# Patient Record
Sex: FEMALE | Race: WHITE | NOT HISPANIC OR LATINO
[De-identification: names, ages, dates, MRNs, and addresses within clinical notes are randomized per-mention and may not be internally consistent; named-entity substitution may affect disease eponyms.]

---

## 2024-02-28 PROBLEM — Z00.00 ENCOUNTER FOR PREVENTIVE HEALTH EXAMINATION: Status: ACTIVE | Noted: 2024-02-28

## 2024-02-29 ENCOUNTER — APPOINTMENT (OUTPATIENT)
Age: 44
End: 2024-02-29
Payer: COMMERCIAL

## 2024-02-29 VITALS
WEIGHT: 137 LBS | HEIGHT: 60 IN | SYSTOLIC BLOOD PRESSURE: 125 MMHG | DIASTOLIC BLOOD PRESSURE: 82 MMHG | BODY MASS INDEX: 26.9 KG/M2

## 2024-02-29 DIAGNOSIS — E04.9 NONTOXIC GOITER, UNSPECIFIED: ICD-10-CM

## 2024-02-29 DIAGNOSIS — Z78.9 OTHER SPECIFIED HEALTH STATUS: ICD-10-CM

## 2024-02-29 DIAGNOSIS — E04.1 NONTOXIC SINGLE THYROID NODULE: ICD-10-CM

## 2024-02-29 DIAGNOSIS — E04.2 NONTOXIC MULTINODULAR GOITER: ICD-10-CM

## 2024-02-29 PROCEDURE — 99204 OFFICE O/P NEW MOD 45 MIN: CPT

## 2024-03-01 PROBLEM — Z78.9 DENIES ALCOHOL CONSUMPTION: Status: ACTIVE | Noted: 2024-03-01

## 2024-03-01 PROBLEM — E04.2 MULTINODULAR GOITER: Status: ACTIVE | Noted: 2024-03-01

## 2024-03-01 PROBLEM — Z78.9 NO FAMILY HISTORY OF THYROID DISEASE: Status: ACTIVE | Noted: 2024-03-01

## 2024-03-01 PROBLEM — E04.9 EUTHYROID GOITER: Status: ACTIVE | Noted: 2024-03-01

## 2024-03-01 PROBLEM — Z78.9 NON-SMOKER: Status: ACTIVE | Noted: 2024-03-01

## 2024-03-01 PROBLEM — E04.1 THYROID NODULE: Status: ACTIVE | Noted: 2024-03-01

## 2024-03-01 NOTE — CONSULT LETTER
[Consult Letter:] : I had the pleasure of evaluating your patient, [unfilled]. [Consult Closing:] : Thank you very much for allowing me to participate in the care of this patient.  If you have any questions, please do not hesitate to contact me. [Sincerely,] : Sincerely, [Dear  ___] : Dear  [unfilled], [FreeTextEntry3] : Dr. Desiree Saxena [FreeTextEntry2] : Dr. Sherry Diaz [DrAlden  ___] : Dr. ARENAS

## 2024-03-01 NOTE — HISTORY OF PRESENT ILLNESS
[de-identified] : Patient referred by Dr. Diaz for evaluation of suspicious isthmus thyroid nodule. She first noticed this nodule in 2021 after a family member noticed the appearance of the nodule on the patient's neck. Thyroid US on 8/11/21 showed normal thyroid size, several benign colloid cysts on right lobe largest measuring 10 mm. On the left lobe, no lesions seen. At the isthmus, a 1.5 x 7 x 13 mm solid or almost completely solid hypoechoic nodule seen. Another 6 x 4 x 6 mm hypoechoic solid nodule seen at isthmus. The larger isthmus nodule was biopsied on 8/13/21 and this showed atypia of undetermined significance and genetic testing results were insufficient. She had an updated thyroid US on 10/14/23 showed subcentimeter thyroid nodules in the right lobe and a 2.4 x 1.1 x 2.2 cm hypoechoic nodule TR4. Biopsy of isthmus nodule was done on 1/26/24 which revealed atypia of undetermined significance and Thyroseq was positive with intermediate risk (40-50%) of malignancy.  Patient states she has not had any symptoms associated with her thyroid. She denies neck pain or swelling, dysphagia, shortness of breath, hoarseness or voice changes, fevers, weight changes, or recent illness. She denies any significant past medical history. She denies past exposure to radiation or radiation therapy.

## 2024-03-01 NOTE — PHYSICAL EXAM
[Midline] : located in midline position [de-identified] : ~2.5 cm nodule appreciated at the isthmus of thyroid, firm and smooth, thyroid appears normal in size, no lymphadenopathy in the neck. Trachea midline.  [Normal] : extraocular movements are normal [de-identified] : Skin: No rash or lesions on visualized skin Psych: normal and appropriate affect [de-identified] : Grossly intact [FreeTextEntry1] : Patient is not in acute distress, sits comfortably in exam room

## 2024-03-01 NOTE — ASSESSMENT
[FreeTextEntry1] : Patient with suspicious thyroid nodule.  I discussed right thyroid lobectomy and isthmus versus total thyroidectomy with CND for treatment. I have reviewed the pathophysiology of the disease process, the area anatomy and the rationale for surgery.  I discussed the risks, benefits and alternative treatments which include but are not limited to bleeding, infection, numbness, hoarseness, hypocalcemia, scarring, and need for reoperation.  I have answered the patient's questions to their satisfaction.  The patient wishes to proceed with the recommended procedure.  They will contact my office to schedule surgery. Reviewed routine post operative management with patient. Patient expressed understanding and wishes to proceed with surgery which we will schedule for her today.

## 2024-03-01 NOTE — REASON FOR VISIT
[Initial Consultation] : an initial consultation for [FreeTextEntry2] : Thyroid nodule suspicious for thyroid cancer [Friend] : friend

## 2024-03-11 ENCOUNTER — OUTPATIENT (OUTPATIENT)
Dept: OUTPATIENT SERVICES | Facility: HOSPITAL | Age: 44
LOS: 1 days | End: 2024-03-11
Payer: COMMERCIAL

## 2024-03-11 VITALS
OXYGEN SATURATION: 99 % | HEART RATE: 72 BPM | TEMPERATURE: 98 F | HEIGHT: 60 IN | RESPIRATION RATE: 18 BRPM | WEIGHT: 143.3 LBS | DIASTOLIC BLOOD PRESSURE: 82 MMHG | SYSTOLIC BLOOD PRESSURE: 119 MMHG

## 2024-03-11 DIAGNOSIS — Z01.818 ENCOUNTER FOR OTHER PREPROCEDURAL EXAMINATION: ICD-10-CM

## 2024-03-11 DIAGNOSIS — Z98.890 OTHER SPECIFIED POSTPROCEDURAL STATES: Chronic | ICD-10-CM

## 2024-03-11 DIAGNOSIS — D49.7 NEOPLASM OF UNSPECIFIED BEHAVIOR OF ENDOCRINE GLANDS AND OTHER PARTS OF NERVOUS SYSTEM: ICD-10-CM

## 2024-03-11 PROCEDURE — 80048 BASIC METABOLIC PNL TOTAL CA: CPT

## 2024-03-11 PROCEDURE — 85027 COMPLETE CBC AUTOMATED: CPT

## 2024-03-11 PROCEDURE — 86900 BLOOD TYPING SEROLOGIC ABO: CPT

## 2024-03-11 PROCEDURE — 84443 ASSAY THYROID STIM HORMONE: CPT

## 2024-03-11 PROCEDURE — 86901 BLOOD TYPING SEROLOGIC RH(D): CPT

## 2024-03-11 PROCEDURE — 84480 ASSAY TRIIODOTHYRONINE (T3): CPT

## 2024-03-11 PROCEDURE — G0463: CPT

## 2024-03-11 PROCEDURE — 86850 RBC ANTIBODY SCREEN: CPT

## 2024-03-11 PROCEDURE — 84436 ASSAY OF TOTAL THYROXINE: CPT

## 2024-03-11 PROCEDURE — 36415 COLL VENOUS BLD VENIPUNCTURE: CPT

## 2024-03-11 NOTE — H&P PST ADULT - NSALCOHOLAMT_GEN_A_CORE_SD
Benign prostatic hypertrophy without lower urinary tract symptoms  BPH (benign prostatic hyperplasia)  Essential hypertension  HTN (hypertension)
1-2 drinks

## 2024-03-11 NOTE — H&P PST ADULT - NSANTHOSAYNRD_GEN_A_CORE
neck inches/No. AARON screening performed.  STOP BANG Legend: 0-2 = LOW Risk; 3-4 = INTERMEDIATE Risk; 5-8 = HIGH Risk neck 13 inches/No. AARON screening performed.  STOP BANG Legend: 0-2 = LOW Risk; 3-4 = INTERMEDIATE Risk; 5-8 = HIGH Risk

## 2024-03-11 NOTE — H&P PST ADULT - PROBLEM SELECTOR PLAN 1
Patient provided with pre-operative instructions and verbalized understanding.  Patient will be NPO on day of surgery. Patient will stop NSAIDs, aspirin, herbal supplements or vitamins 1 week prior to surgery.  Chlorhexidine wash provided with instructions, verbalized understanding. Bactrim Counseling:  I discussed with the patient the risks of sulfa antibiotics including but not limited to GI upset, allergic reaction, drug rash, diarrhea, dizziness, photosensitivity, and yeast infections.  Rarely, more serious reactions can occur including but not limited to aplastic anemia, agranulocytosis, methemoglobinemia, blood dyscrasias, liver or kidney failure, lung infiltrates or desquamative/blistering drug rashes.

## 2024-03-11 NOTE — H&P PST ADULT - HISTORY OF PRESENT ILLNESS
Patient is a 43 year old F with PMHx of __ or no pertinent medical history presents for perioperative testing for total thyroidectomy with central neck dissection and closure with forzen section with Dr. Desiree Saxena scheduled for 03/18/2024. Reports___. Denies ___ or any acute symptoms at this time. Patient otherwise feels well overall.    Patient is a 43 year old F with PMHx of __ or no pertinent medical history presents for perioperative testing for total thyroidectomy with central neck dissection and closure with forzen section with Dr. Desiree Saxena scheduled for 03/18/2024. Reports___. Denies ___ or any acute symptoms at this time. Patient otherwise feels well overall.   had meolecular testing, gorwing nodule, opted of sx  Patient is a 43 year old F with no pertinent medical history presents for perioperative testing for total thyroidectomy with central neck dissection and closure with frozen section with Dr. Desiree Saxena scheduled for 03/18/2024. Reports having a thyroid nodule monitored, recently grew in size, biopsy came back inconclusive  but molecular test came back positive therefore recommended for upcoming procedure. Denies any acute symptoms at this time. Patient otherwise feels well overall.

## 2024-03-17 ENCOUNTER — TRANSCRIPTION ENCOUNTER (OUTPATIENT)
Age: 44
End: 2024-03-17

## 2024-03-18 ENCOUNTER — RESULT REVIEW (OUTPATIENT)
Age: 44
End: 2024-03-18

## 2024-03-18 ENCOUNTER — APPOINTMENT (OUTPATIENT)
Age: 44
End: 2024-03-18
Payer: COMMERCIAL

## 2024-03-18 ENCOUNTER — TRANSCRIPTION ENCOUNTER (OUTPATIENT)
Age: 44
End: 2024-03-18

## 2024-03-18 ENCOUNTER — OUTPATIENT (OUTPATIENT)
Dept: OUTPATIENT SERVICES | Facility: HOSPITAL | Age: 44
LOS: 1 days | End: 2024-03-18
Payer: COMMERCIAL

## 2024-03-18 VITALS
TEMPERATURE: 97 F | WEIGHT: 143.3 LBS | HEIGHT: 60 IN | OXYGEN SATURATION: 99 % | HEART RATE: 79 BPM | SYSTOLIC BLOOD PRESSURE: 121 MMHG | RESPIRATION RATE: 13 BRPM | DIASTOLIC BLOOD PRESSURE: 80 MMHG

## 2024-03-18 VITALS
RESPIRATION RATE: 16 BRPM | SYSTOLIC BLOOD PRESSURE: 111 MMHG | HEART RATE: 71 BPM | OXYGEN SATURATION: 96 % | DIASTOLIC BLOOD PRESSURE: 76 MMHG

## 2024-03-18 DIAGNOSIS — D49.7 NEOPLASM OF UNSPECIFIED BEHAVIOR OF ENDOCRINE GLANDS AND OTHER PARTS OF NERVOUS SYSTEM: ICD-10-CM

## 2024-03-18 DIAGNOSIS — Z98.890 OTHER SPECIFIED POSTPROCEDURAL STATES: Chronic | ICD-10-CM

## 2024-03-18 PROCEDURE — 13132 CMPLX RPR F/C/C/M/N/AX/G/H/F: CPT | Mod: 59

## 2024-03-18 PROCEDURE — C9399: CPT

## 2024-03-18 PROCEDURE — 88307 TISSUE EXAM BY PATHOLOGIST: CPT | Mod: 26

## 2024-03-18 PROCEDURE — 88307 TISSUE EXAM BY PATHOLOGIST: CPT

## 2024-03-18 PROCEDURE — C1889: CPT

## 2024-03-18 PROCEDURE — 60252 REMOVAL OF THYROID: CPT

## 2024-03-18 DEVICE — LIGATING CLIPS WECK HORIZON SMALL (YELLOW) 24: Type: IMPLANTABLE DEVICE | Status: FUNCTIONAL

## 2024-03-18 DEVICE — LIGATING CLIPS WECK HORIZON MEDIUM (BLUE) 6: Type: IMPLANTABLE DEVICE | Status: FUNCTIONAL

## 2024-03-18 RX ORDER — BENZOCAINE AND MENTHOL 5; 1 G/100ML; G/100ML
1 LIQUID ORAL
Refills: 0 | Status: DISCONTINUED | OUTPATIENT
Start: 2024-03-18 | End: 2024-04-01

## 2024-03-18 RX ORDER — ACETAMINOPHEN 500 MG
650 TABLET ORAL EVERY 6 HOURS
Refills: 0 | Status: DISCONTINUED | OUTPATIENT
Start: 2024-03-18 | End: 2024-04-01

## 2024-03-18 RX ORDER — HYDROMORPHONE HYDROCHLORIDE 2 MG/ML
0.5 INJECTION INTRAMUSCULAR; INTRAVENOUS; SUBCUTANEOUS
Refills: 0 | Status: DISCONTINUED | OUTPATIENT
Start: 2024-03-18 | End: 2024-03-18

## 2024-03-18 RX ORDER — SODIUM CHLORIDE 9 MG/ML
1000 INJECTION, SOLUTION INTRAVENOUS
Refills: 0 | Status: DISCONTINUED | OUTPATIENT
Start: 2024-03-18 | End: 2024-03-18

## 2024-03-18 RX ORDER — ONDANSETRON 8 MG/1
4 TABLET, FILM COATED ORAL ONCE
Refills: 0 | Status: DISCONTINUED | OUTPATIENT
Start: 2024-03-18 | End: 2024-03-18

## 2024-03-18 RX ORDER — ACETAMINOPHEN 500 MG
2 TABLET ORAL
Qty: 0 | Refills: 0 | DISCHARGE
Start: 2024-03-18

## 2024-03-18 RX ORDER — HYDROMORPHONE HYDROCHLORIDE 2 MG/ML
1 INJECTION INTRAMUSCULAR; INTRAVENOUS; SUBCUTANEOUS
Refills: 0 | Status: DISCONTINUED | OUTPATIENT
Start: 2024-03-18 | End: 2024-03-18

## 2024-03-18 RX ADMIN — Medication 2 TABLET(S): at 13:50

## 2024-03-18 RX ADMIN — SODIUM CHLORIDE 50 MILLILITER(S): 9 INJECTION, SOLUTION INTRAVENOUS at 08:23

## 2024-03-18 NOTE — ASU DISCHARGE PLAN (ADULT/PEDIATRIC) - CARE PROVIDER_API CALL
Desiree Saxena Lindsey  Surgery  [address] [place] [address], [facility] [address]  [place] [address]  Phone: [phone]  Fax: [phone]  Scheduled Appointment: 03/26/2024

## 2024-03-18 NOTE — ASU PATIENT PROFILE, ADULT - FALL HARM RISK - UNIVERSAL INTERVENTIONS
Bed in lowest position, wheels locked, appropriate side rails in place/Call bell, personal items and telephone in reach/Instruct patient to call for assistance before getting out of bed or chair/Non-slip footwear when patient is out of bed/[place] to call system/Physically safe environment - no spills, clutter or unnecessary equipment/Purposeful Proactive Rounding/Room/bathroom lighting operational, light cord in reach

## 2024-03-18 NOTE — BRIEF OPERATIVE NOTE - OPERATION/FINDINGS
Total thyroidectomy with central neck dissection, hemostasis with vicryl ties, strap muscle fascia closed over trachea and skin closed in layers.

## 2024-03-18 NOTE — ASU DISCHARGE PLAN (ADULT/PEDIATRIC) - NURSING INSTRUCTIONS
All discharge information reviewed with patient including pain, safety, medication and follow up care . Pt acknowledges understanding of discharge instructions. Take calcium as directed.

## 2024-03-18 NOTE — ASU DISCHARGE PLAN (ADULT/PEDIATRIC) - ASU DC SPECIAL INSTRUCTIONSFT
- Wear a supportive bra for 24 hours per day for at least 2 weeks to help with scar healing  - Do not take NSAID medications  - Take tylenol 650 mg every 6 hours as needed for pain  - Start taking calcium carbonate 500 mg or 600 mg as two tablets three times daily. This is to prevent low calcium levels after surgery.

## 2024-03-19 ENCOUNTER — NON-APPOINTMENT (OUTPATIENT)
Age: 44
End: 2024-03-19

## 2024-03-20 ENCOUNTER — NON-APPOINTMENT (OUTPATIENT)
Age: 44
End: 2024-03-20

## 2024-03-21 LAB
25(OH)D3 SERPL-MCNC: 17.9 NG/ML
CALCIUM SERPL-MCNC: 11.4 MG/DL
CALCIUM SERPL-MCNC: 11.4 MG/DL
PARATHYROID HORMONE INTACT: 12 PG/ML
T3 SERPL-MCNC: 82 NG/DL
T4 FREE SERPL-MCNC: 1.6 NG/DL
TSH SERPL-ACNC: 1.49 UIU/ML

## 2024-03-26 ENCOUNTER — NON-APPOINTMENT (OUTPATIENT)
Age: 44
End: 2024-03-26

## 2024-03-26 ENCOUNTER — APPOINTMENT (OUTPATIENT)
Dept: SURGERY | Facility: CLINIC | Age: 44
End: 2024-03-26
Payer: COMMERCIAL

## 2024-03-26 DIAGNOSIS — D49.7 NEOPLASM OF UNSPECIFIED BEHAVIOR OF ENDOCRINE GLANDS AND OTHER PARTS OF NERVOUS SYSTEM: ICD-10-CM

## 2024-03-26 PROBLEM — Z78.9 OTHER SPECIFIED HEALTH STATUS: Chronic | Status: ACTIVE | Noted: 2024-03-11

## 2024-03-26 LAB — SURGICAL PATHOLOGY STUDY: SIGNIFICANT CHANGE UP

## 2024-03-26 PROCEDURE — 36415 COLL VENOUS BLD VENIPUNCTURE: CPT

## 2024-03-26 PROCEDURE — 99024 POSTOP FOLLOW-UP VISIT: CPT

## 2024-03-26 NOTE — HISTORY OF PRESENT ILLNESS
[de-identified] : Patient referred by Dr. Diaz for evaluation of suspicious isthmus thyroid nodule. She first noticed this nodule in 2021 after a family member noticed the appearance of the nodule on the patient's neck. Thyroid US on 8/11/21 showed normal thyroid size, several benign colloid cysts on right lobe largest measuring 10 mm. On the left lobe, no lesions seen. At the isthmus, a 1.5 x 7 x 13 mm solid or almost completely solid hypoechoic nodule seen. Another 6 x 4 x 6 mm hypoechoic solid nodule seen at isthmus. The larger isthmus nodule was biopsied on 8/13/21 and this showed atypia of undetermined significance and genetic testing results were insufficient. She had an updated thyroid US on 10/14/23 showed subcentimeter thyroid nodules in the right lobe and a 2.4 x 1.1 x 2.2 cm hypoechoic nodule TR4. Biopsy of isthmus nodule was done on 1/26/24 which revealed atypia of undetermined significance and Thyroseq was positive with intermediate risk (40-50%) of malignancy.  3/18/24 total thyroidectomy with CND, path pending.  Patient denies dysphagia, hoarseness, pain or parathesias. has not been taking calciuma nd started thyroid meds per Dr. Diaz. feeling well.  I have reviewed all old and new data and available images.

## 2024-03-26 NOTE — ASSESSMENT
[FreeTextEntry1] : Patient with suspicious thyroid nodule.doing well s/p total thyroidectomy.  barbie sent.  seeing Dr Diaz 5/2024, RTo 4 mo.  I have answered their questions to the best of my ability.

## 2024-03-26 NOTE — PHYSICAL EXAM
[de-identified] : ~2.5 cm nodule appreciated at the isthmus of thyroid, firm and smooth, thyroid appears normal in size, no lymphadenopathy in the neck. Trachea midline.  [Midline] : located in midline position [Normal] : orientation to person, place, and time: normal [FreeTextEntry1] : Patient is not in acute distress, sits comfortably in exam room [de-identified] : Grossly intact [de-identified] : Skin: No rash or lesions on visualized skin Psych: normal and appropriate affect

## 2024-07-25 ENCOUNTER — APPOINTMENT (OUTPATIENT)
Dept: SURGERY | Facility: CLINIC | Age: 44
End: 2024-07-25
Payer: COMMERCIAL

## 2024-07-25 ENCOUNTER — LABORATORY RESULT (OUTPATIENT)
Age: 44
End: 2024-07-25

## 2024-07-25 DIAGNOSIS — D49.7 NEOPLASM OF UNSPECIFIED BEHAVIOR OF ENDOCRINE GLANDS AND OTHER PARTS OF NERVOUS SYSTEM: ICD-10-CM

## 2024-07-25 DIAGNOSIS — E04.9 NONTOXIC GOITER, UNSPECIFIED: ICD-10-CM

## 2024-07-25 PROCEDURE — 99213 OFFICE O/P EST LOW 20 MIN: CPT

## 2024-07-25 PROCEDURE — G2211 COMPLEX E/M VISIT ADD ON: CPT | Mod: NC

## 2024-07-25 NOTE — PHYSICAL EXAM
[de-identified] : Incision healing with min swelling, scar min discussed. no cervical or supraclavicular adenopathy, trachea midline, no palpable mass [Midline] : located in midline position [Normal] : orientation to person, place, and time: normal [FreeTextEntry1] : Patient is not in acute distress, sits comfortably in exam room [de-identified] : Grossly intact [de-identified] : Skin: No rash or lesions on visualized skin Psych: normal and appropriate affect

## 2024-07-25 NOTE — HISTORY OF PRESENT ILLNESS
[de-identified] : Patient referred by Dr. Diaz for evaluation of suspicious isthmus thyroid nodule. She first noticed this nodule in 2021 after a family member noticed the appearance of the nodule on the patient's neck. Thyroid US on 8/11/21 showed normal thyroid size, several benign colloid cysts on right lobe largest measuring 10 mm. On the left lobe, no lesions seen. At the isthmus, a 1.5 x 7 x 13 mm solid or almost completely solid hypoechoic nodule seen. Another 6 x 4 x 6 mm hypoechoic solid nodule seen at isthmus. The larger isthmus nodule was biopsied on 8/13/21 and this showed atypia of undetermined significance and genetic testing results were insufficient. She had an updated thyroid US on 10/14/23 showed subcentimeter thyroid nodules in the right lobe and a 2.4 x 1.1 x 2.2 cm hypoechoic nodule TR4. Biopsy of isthmus nodule was done on 1/26/24 which revealed atypia of undetermined significance and Thyroseq was positive with intermediate risk (40-50%) of malignancy.  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  Patient denies dysphagia, hoarseness.  Patient currently on 125 mcg feeling well.  Thyroglobulin 0.5 in April.  Patient scheduled for CHAN scan and treatment in 2 weeks.  Ordered by Dr. Diaz. feeling well.  I have reviewed all old and new data and available images.

## 2024-07-25 NOTE — ASSESSMENT
[FreeTextEntry1] : Patient with  hurthle cell cancer s/p total thyroidectomy.  barbie sent.  patient will contact office to review.  patient scheduled for [place] will follow up.  RTo 6 mo.  I reviewed the expected course of illness and the intent of current treatment with the patient. I have answered her questions. scheduled for neck US 10/2024 with Dr hanson.

## 2024-07-30 ENCOUNTER — NON-APPOINTMENT (OUTPATIENT)
Age: 44
End: 2024-07-30

## 2024-07-30 LAB
25(OH)D3 SERPL-MCNC: 32.7 NG/ML
CALCIUM SERPL-MCNC: 10.2 MG/DL
CALCIUM SERPL-MCNC: 10.2 MG/DL
PARATHYROID HORMONE INTACT: 23 PG/ML
T3 SERPL-MCNC: 115 NG/DL
T4 FREE SERPL-MCNC: 1.6 NG/DL
THYROGLOB AB SERPL-ACNC: <20 IU/ML
THYROGLOB SERPL-MCNC: <0.2 NG/ML
TSH SERPL-ACNC: 0.29 UIU/ML

## 2024-08-05 ENCOUNTER — OUTPATIENT (OUTPATIENT)
Dept: OUTPATIENT SERVICES | Facility: HOSPITAL | Age: 44
LOS: 1 days | End: 2024-08-05
Payer: COMMERCIAL

## 2024-08-05 ENCOUNTER — APPOINTMENT (OUTPATIENT)
Dept: NUCLEAR MEDICINE | Facility: HOSPITAL | Age: 44
End: 2024-08-05

## 2024-08-05 DIAGNOSIS — Z98.890 OTHER SPECIFIED POSTPROCEDURAL STATES: Chronic | ICD-10-CM

## 2024-08-05 DIAGNOSIS — C73 MALIGNANT NEOPLASM OF THYROID GLAND: ICD-10-CM

## 2024-08-06 ENCOUNTER — APPOINTMENT (OUTPATIENT)
Dept: NUCLEAR MEDICINE | Facility: HOSPITAL | Age: 44
End: 2024-08-06

## 2024-08-07 ENCOUNTER — APPOINTMENT (OUTPATIENT)
Dept: NUCLEAR MEDICINE | Facility: HOSPITAL | Age: 44
End: 2024-08-07

## 2024-08-09 ENCOUNTER — APPOINTMENT (OUTPATIENT)
Dept: NUCLEAR MEDICINE | Facility: HOSPITAL | Age: 44
End: 2024-08-09

## 2024-08-09 PROCEDURE — 78830 RP LOCLZJ TUM SPECT W/CT 1: CPT | Mod: 26

## 2024-08-09 PROCEDURE — 78830 RP LOCLZJ TUM SPECT W/CT 1: CPT

## 2024-08-09 PROCEDURE — 78018 THYROID MET IMAGING BODY: CPT | Mod: 26

## 2024-08-09 PROCEDURE — 78018 THYROID MET IMAGING BODY: CPT

## 2024-08-09 PROCEDURE — A9528: CPT

## 2024-08-09 PROCEDURE — 78020 THYROID MET UPTAKE: CPT

## 2024-08-09 PROCEDURE — 96372 THER/PROPH/DIAG INJ SC/IM: CPT

## 2024-08-09 PROCEDURE — 78020 THYROID MET UPTAKE: CPT | Mod: 26

## 2024-09-25 ENCOUNTER — OUTPATIENT (OUTPATIENT)
Dept: OUTPATIENT SERVICES | Facility: HOSPITAL | Age: 44
LOS: 1 days | End: 2024-09-25
Payer: COMMERCIAL

## 2024-09-25 ENCOUNTER — APPOINTMENT (OUTPATIENT)
Dept: NUCLEAR MEDICINE | Facility: HOSPITAL | Age: 44
End: 2024-09-25
Payer: COMMERCIAL

## 2024-09-25 DIAGNOSIS — Z98.890 OTHER SPECIFIED POSTPROCEDURAL STATES: Chronic | ICD-10-CM

## 2024-09-25 DIAGNOSIS — C73 MALIGNANT NEOPLASM OF THYROID GLAND: ICD-10-CM

## 2024-09-25 LAB — HCG SERPL-ACNC: <2 MIU/ML — SIGNIFICANT CHANGE UP

## 2024-09-25 PROCEDURE — 99203 OFFICE O/P NEW LOW 30 MIN: CPT

## 2024-09-25 RX ORDER — ONDANSETRON 4 MG/1
4 TABLET, ORALLY DISINTEGRATING ORAL 4 TIMES DAILY
Qty: 20 | Refills: 0 | Status: ACTIVE | COMMUNITY
Start: 2024-09-25 | End: 1900-01-01

## 2024-09-26 ENCOUNTER — APPOINTMENT (OUTPATIENT)
Dept: NUCLEAR MEDICINE | Facility: HOSPITAL | Age: 44
End: 2024-09-26

## 2024-09-27 ENCOUNTER — APPOINTMENT (OUTPATIENT)
Dept: NUCLEAR MEDICINE | Facility: HOSPITAL | Age: 44
End: 2024-09-27

## 2024-09-27 PROCEDURE — 79005 NUCLEAR RX ORAL ADMIN: CPT | Mod: 26

## 2024-09-30 ENCOUNTER — APPOINTMENT (OUTPATIENT)
Dept: NUCLEAR MEDICINE | Facility: HOSPITAL | Age: 44
End: 2024-09-30

## 2024-09-30 PROCEDURE — A9517: CPT

## 2024-09-30 PROCEDURE — 96372 THER/PROPH/DIAG INJ SC/IM: CPT

## 2024-09-30 PROCEDURE — 78018 THYROID MET IMAGING BODY: CPT

## 2024-09-30 PROCEDURE — 36415 COLL VENOUS BLD VENIPUNCTURE: CPT

## 2024-09-30 PROCEDURE — 79005 NUCLEAR RX ORAL ADMIN: CPT

## 2024-09-30 PROCEDURE — 78018 THYROID MET IMAGING BODY: CPT | Mod: 26

## 2024-09-30 PROCEDURE — 84702 CHORIONIC GONADOTROPIN TEST: CPT

## 2024-10-04 NOTE — ASU PATIENT PROFILE, ADULT - ALCOHOL USE HISTORY SINGLE SELECT
Bed/Stretcher in lowest position, wheels locked, appropriate side rails in place/Call bell, personal items and telephone in reach/Instruct patient to call for assistance before getting out of bed/chair/stretcher/Non-slip footwear applied when patient is off stretcher/[place] to call system/Physically safe environment - no spills, clutter or unnecessary equipment/Purposeful proactive rounding/Room/bathroom lighting operational, light cord in reach yes...

## 2025-01-23 ENCOUNTER — APPOINTMENT (OUTPATIENT)
Dept: SURGERY | Facility: CLINIC | Age: 45
End: 2025-01-23

## (undated) DEVICE — SUT ETHILON 3-0 18" FS-1

## (undated) DEVICE — DRSG BENZOIN 0.6CC

## (undated) DEVICE — NDL HYPO SAFE 25G X 1.5" (ORANGE)

## (undated) DEVICE — SUCTION YANKAUER TAPERED BULBOUS NO VENT

## (undated) DEVICE — TUBING 10FT W/WAND

## (undated) DEVICE — SUT MONOCRYL 4-0 27" PS-2 UNDYED

## (undated) DEVICE — DRAPE 3/4 SHEET 52X76"

## (undated) DEVICE — SUT VICRYL 3-0 18" TIES UNDYED

## (undated) DEVICE — POSITIONER FOAM EGG CRATE ULNAR 2PCS (PINK)

## (undated) DEVICE — PACK MINOR

## (undated) DEVICE — SYR LUER LOK 10CC

## (undated) DEVICE — DRAPE MAGNETIC INSTRUMENT MEDIUM

## (undated) DEVICE — SUT VICRYL 2-0 18" TIES UNDYED

## (undated) DEVICE — DRAPE INSTRUMENT POUCH 6.75" X 11"

## (undated) DEVICE — DRAIN RESERVOIR FOR JACKSON PRATT 100CC CARDINAL

## (undated) DEVICE — DRSG STERISTRIPS 0.5 X 4"

## (undated) DEVICE — SPONGE PEANUT AUTO COUNT

## (undated) DEVICE — VENODYNE/SCD SLEEVE CALF MEDIUM

## (undated) DEVICE — WARMING BLANKET LOWER ADULT

## (undated) DEVICE — SUT POLYSORB 2-0 30" V-20 UNDYED

## (undated) DEVICE — SUT VICRYL 0 18" TIES UNDYED

## (undated) DEVICE — SOL IRR POUR H2O 1500ML

## (undated) DEVICE — SUT CHROMIC 4-0 30" V-20

## (undated) DEVICE — DRAPE TOWEL BLUE 17" X 24"

## (undated) DEVICE — DRAIN JACKSON PRATT 7MM FLAT FULL NO TROCAR

## (undated) DEVICE — DRAPE FLUID WARMER 44 X 44"

## (undated) DEVICE — ELCTR GROUNDING PAD ADULT COVIDIEN

## (undated) DEVICE — LABELS BLANK W PEN

## (undated) DEVICE — VISITEC 4X4

## (undated) DEVICE — ELCTR BOVIE PENCIL HANDPIECE

## (undated) DEVICE — SYR ASEPTO

## (undated) DEVICE — DRAPE LAPAROTOMY TRANSVERSE

## (undated) DEVICE — SAFETY PIN

## (undated) DEVICE — SUT SILK 2-0 30" SH